# Patient Record
Sex: MALE | Race: WHITE | NOT HISPANIC OR LATINO | URBAN - METROPOLITAN AREA
[De-identification: names, ages, dates, MRNs, and addresses within clinical notes are randomized per-mention and may not be internally consistent; named-entity substitution may affect disease eponyms.]

---
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
## 2024-01-27 ENCOUNTER — PATIENT MESSAGE (OUTPATIENT)
Dept: GENETICS | Facility: CLINIC | Age: 19
End: 2024-01-27
Payer: COMMERCIAL

## 2024-02-19 ENCOUNTER — PATIENT MESSAGE (OUTPATIENT)
Dept: GENETICS | Facility: CLINIC | Age: 19
End: 2024-02-19
Payer: COMMERCIAL

## 2024-04-05 ENCOUNTER — TELEPHONE (OUTPATIENT)
Dept: GENETICS | Facility: CLINIC | Age: 19
End: 2024-04-05
Payer: COMMERCIAL

## 2024-04-05 NOTE — TELEPHONE ENCOUNTER
MOP insisted on a FU up appt with Dr. English. MOP informed that she has passed up other genetic appt due to the fact that she was waiting for a FU up appt for pt. Informed pt mom that pt is on a scheduling list. Provider only sees pt two days out the week. Could not provide a exact date when appt would be. Informed pt mom that a message will be sent to Dr. English and GC to see if appt will be classified as urgent to get a appt ASAP.  Someone will callback with a update. Pt mom verbalized understanding.

## 2024-05-01 ENCOUNTER — TELEPHONE (OUTPATIENT)
Dept: GENETICS | Facility: CLINIC | Age: 19
End: 2024-05-01
Payer: COMMERCIAL

## 2024-05-03 ENCOUNTER — TELEPHONE (OUTPATIENT)
Dept: GENETICS | Facility: CLINIC | Age: 19
End: 2024-05-03
Payer: COMMERCIAL

## 2024-05-03 NOTE — TELEPHONE ENCOUNTER
----- Message from Melissa English MD sent at 5/3/2024  2:24 PM CDT -----  Let's get this patient on with Marta for STACY consent. Orders are in.    Dom, can you please link the orders?    MA

## 2024-05-06 ENCOUNTER — LAB VISIT (OUTPATIENT)
Dept: LAB | Facility: HOSPITAL | Age: 19
End: 2024-05-06
Attending: MEDICAL GENETICS
Payer: COMMERCIAL

## 2024-05-06 ENCOUNTER — OFFICE VISIT (OUTPATIENT)
Dept: GENETICS | Facility: CLINIC | Age: 19
End: 2024-05-06
Payer: COMMERCIAL

## 2024-05-06 DIAGNOSIS — R63.8 SYMPTOMS CONCERNING NUTRITION, METABOLISM, AND DEVELOPMENT: ICD-10-CM

## 2024-05-06 DIAGNOSIS — R63.8 NUTRITION, METABOLISM, AND DEVELOPMENT SYMPTOMS: Primary | ICD-10-CM

## 2024-05-06 DIAGNOSIS — R41.89 BRAIN FOG: ICD-10-CM

## 2024-05-06 LAB — MISCELLANEOUS GENETIC TEST NAME: NORMAL

## 2024-05-06 PROCEDURE — 83605 ASSAY OF LACTIC ACID: CPT | Performed by: MEDICAL GENETICS

## 2024-05-06 PROCEDURE — 36415 COLL VENOUS BLD VENIPUNCTURE: CPT | Performed by: MEDICAL GENETICS

## 2024-05-06 PROCEDURE — 82140 ASSAY OF AMMONIA: CPT | Performed by: MEDICAL GENETICS

## 2024-05-06 PROCEDURE — 82139 AMINO ACIDS QUAN 6 OR MORE: CPT | Performed by: MEDICAL GENETICS

## 2024-05-06 PROCEDURE — 96040 PR GENETIC COUNSELING, EACH 30 MIN: CPT | Mod: S$GLB,,,

## 2024-05-06 PROCEDURE — 99999 PR PBB SHADOW E&M-EST. PATIENT-LVL I: CPT | Mod: PBBFAC,,,

## 2024-05-06 PROCEDURE — 99499 UNLISTED E&M SERVICE: CPT | Mod: S$GLB,,, | Performed by: MEDICAL GENETICS

## 2024-05-06 NOTE — PROGRESS NOTES
ESTABLISHED PATIENT - WHOLE EXOME SEQUENCING CONSENT     Zev Rdz   DOS: 2024   : 2005  MRN: [id number]    I met with Mr. Rdz and his mother for whole exome sequencing (STACY) counseling and consent.     STACY is one of the most comprehensive tests available clinically and is used to look for mutations or likely pathogenic variants the body's exome, which includes over 20,000 genes. We discussed that [facility] will use Mr. Rdz's clinical information when analyzing results and that performing the test as a trio involving the whole family allows [facility] to delineate the significance of any variants identified.      We discussed the limitations and possible results involved in STACY. A diagnosis is found in about 25% of those who have had STACY testing. A positive result may explain Mr. Rdz's symptoms and can be informative for the family. A negative STACY result does not rule out that the underlying condition is heritable in nature and reanalysis or additional genetic testing may be possible in the future. We also discussed that variants of uncertain significance (VUS), or changes in a gene with unknown clinical significance are possible. STACY cannot detect certain genetic changes such as trinucleotide repeats or methylation defects.      We discussed that the family can opt out of receiving incidental or secondary findings from the STACY. These findings are included in the [employer]G's list clincially actionable conditions. About 4% of patients who undergo STACY receive an incidental finding. These genes are involved in various conditions such as hereditary cancer syndromes, heart, neurological, and kidney diseases. We also discussed that unexpected results such as nonpaternity or consanguinity may be revealed.     We also spent time discussing the Genetic Information Nondiscrimination Act (NATE) that protects individuals from discrimination on the basis of genetic information from medical insurance providers and  employers. The law does not cover life insurance or long-term disability insurance, however.     The parents of Zev Rdz did consent for STACY and did elect to receive secondary findings. Parents will submit parental samples and did elect to receive secondary findings for themselves.    RECOMMENDATIONS/PLAN:  [facility] Whole Exome Sequencing Trio; Samples collected today for Mr. Rdz and mother; buccal kit sent home for father; TAT 4-6 weeks  Follow-up pending results of testing    TIME SPENT: 15 minutes with more than 50% spent counseling.     Marta Cotter, [facility], [facility]  Genetic Counselor  [facility]

## 2024-05-07 LAB
AMMONIA PLAS-SCNC: 29 UMOL/L (ref 10–50)
LACTATE SERPL-SCNC: 1.1 MMOL/L (ref 0.5–2.2)

## 2024-05-10 LAB
1ME-HIST SERPL-SCNC: 40 NMOL/ML
3 METHYLGLUTARYLCARNITINE, C6-DC: 0.04 NMOL/ML
3 OH DECENOYLCARNITINE, C10:1 OH: 0.01 NMOL/ML
3 OH DODEDENOYLCARNITINE, C12:1 OH: 0.02 NMOL/ML
3 OH ISOBUTYRYLCARNITINE, C4-OH: 0.01 NMOL/ML
3 OH ISOVALERYLCARITINE, C5 OH: 0.02 NMOL/ML
3 OH OCTADECANOYLCARITINE C 18-OH: 0 NMOL/ML
3ME-HISTIDINE SERPL-SCNC: 6 NMOL/ML (ref 2–9)
3OH-DODECANOYLCARN SERPL-SCNC: 0.01 NMOL/ML
3OH-HEXANOYLCARN SERPL-SCNC: 0.01 NMOL/ML
3OH-LINOLEOYLCARN SERPL-SCNC: <0.02 NMOL/ML
3OH-OLEOYLCARN SERPL-SCNC: <0.01 NMOL/ML
3OH-PALMITOLEYLCARN SERPL-SCNC: 0 NMOL/ML
3OH-PALMITOYLCARN SERPL-SCNC: 0 NMOL/ML
3OH-TDECANOYLCARN SERPL-SCNC: 0 NMOL/ML
3OH-TDECENOYLCARN SERPL-SCNC: 0.02 NMOL/ML
A-AMINOBUTYR SERPL-SCNC: 22 NMOL/ML (ref 9–37)
AAA SERPL-SCNC: 1 NMOL/ML
ACETYLCARN SERPL-SCNC: 4.54 NMOL/ML (ref 2–17.83)
ACRYLYLCARNITINE, C3:1: <0.02 NMOL/ML
ACYLCARNITINE PATTERN SERPL-IMP: NORMAL
ACYLCARNITINE SERPL-SCNC: 5 NMOL/ML (ref 5–30)
ACYLCARNITINE/C0 SERPL-SRTO: 0.1 {RATIO} (ref 0.1–0.8)
ALANINE SERPL-SCNC: 344 NMOL/ML (ref 200–579)
ALLOISOLEUCINE SERPL-SCNC: 3 NMOL/ML
AMINO ACID PAT SERPL-IMP: ABNORMAL
ANNOTATION COMMENT IMP: NORMAL
ANSERINE SERPL-SCNC: 0 NMOL/ML
ARGININE SERPL-SCNC: 113 NMOL/ML (ref 32–120)
ARGININOSUCCINATE SERPL-SCNC: 0 NMOL/ML
ASPARAGINE SERPL-SCNC: 58 NMOL/ML (ref 37–92)
ASPARTATE SERPL-SCNC: 3 NMOL/ML
B-AIB SERPL-SCNC: 2 NMOL/ML
B-ALANINE SERPL-SCNC: 39 NMOL/ML
BENZOYLCARNITINE: <0.01 NMOL/ML
CARNITINE FREE SERPL-SCNC: 39 NMOL/ML (ref 25–54)
CARNITINE SERPL-SCNC: 44 NMOL/ML (ref 34–78)
CARNOSINE SERPL-SCNC: 0 NMOL/ML
CITRULLINE SERPL-SCNC: 53 NMOL/ML (ref 17–46)
CLINICAL BIOCHEMIST REVIEW: NORMAL
CYSTATHIONIN SERPL-SCNC: <1 NMOL/ML
CYSTINE SERPL-SCNC: 31 NMOL/ML (ref 3–95)
DECADIONOYLCARNITINE, C10:2: <0.05 NMOL/ML
DECANOYLCARN SERPL-SCNC: 0.08 NMOL/ML
DECENOYLCARN SERPL-SCNC: 0.08 NMOL/ML
DODECANEDIOYLCARNITINE, C12-DC: 0 NMOL/ML
DODECANOYLCARN SERPL-SCNC: 0.04 NMOL/ML
DODECENOYLCARN SERPL-SCNC: 0.02 NMOL/ML
ETHANOLAMINE SERPL-SCNC: 20 NMOL/ML
FORMIMINOGLUTAMATE, FIGLU: <0.01 NMOL/ML
GABA SERPL-SCNC: 0 NMOL/ML
GLUTAMATE SERPL-SCNC: 39 NMOL/ML (ref 13–113)
GLUTAMINE SERPL-SCNC: 572 NMOL/ML (ref 371–957)
GLUTARYLCARN SERPL-SCNC: 0.03 NMOL/ML
GLYCINE SERPL-SCNC: 260 NMOL/ML (ref 126–490)
HEPTANOYLCARNITINE, C7: <0.01 NMOL/ML
HEXANOYLCARN SERPL-SCNC: 0.02 NMOL/ML
HEXENOLYLCARNITINE, C6:1: <0.01 NMOL/ML
HISTIDINE SERPL-SCNC: 101 NMOL/ML (ref 39–123)
HOMOCITRULLINE SERPL-SCNC: 0 NMOL/ML
ISOBUTYRYLCARN SERPL-SCNC: <0.12 NMOL/ML
ISOLEUCINE SERPL-SCNC: 86 NMOL/ML (ref 36–107)
ISOVALERYL+MEBUTYRYLCARN SERPL-SCNC: 0.1 NMOL/ML
LEUCINE SERPL-SCNC: 150 NMOL/ML (ref 68–183)
LINOLEOYLCARN SERPL-SCNC: 0.03 NMOL/ML
LYSINE SERPL-SCNC: 194 NMOL/ML (ref 103–255)
MALONYLCARNITINE, C3-DC: 0.03 NMOL/ML
METHIONINE SERPL-SCNC: 32 NMOL/ML (ref 4–44)
METHYLMALONYL SUCCINYLCARN, C4-DC: 0.03 NMOL/ML
OCTANEDIOYLCARNITINE, C8-DC: 0 NMOL/ML
OCTANOYLCARN SERPL-SCNC: 0.11 NMOL/ML
OCTENOYLCARN SERPL-SCNC: 0.09 NMOL/ML
OH-LYSINE SERPL-SCNC: 0 NMOL/ML
OH-PROLINE SERPL-SCNC: 23 NMOL/ML (ref 4–29)
OLEOYLCARN SERPL-SCNC: 0.06 NMOL/ML
ORNITHINE SERPL-SCNC: 63 NMOL/ML (ref 38–130)
PALMITOLEYLCARN SERPL-SCNC: 0.01 NMOL/ML
PALMITOYLCARN SERPL-SCNC: 0.07 NMOL/ML
PETN/CREAT UR-RTO: <2 NMOL/ML
PHE SERPL-SCNC: 53 NMOL/ML (ref 35–80)
PHENYLACETYLCARNITINE: 0.02 NMOL/ML
PHOSPHOSERINE/CREAT UR-RTO: 0 NMOL/ML
PROLINE SERPL-SCNC: 143 NMOL/ML (ref 97–368)
PROPIONYLCARN SERPL-SCNC: 0.45 NMOL/ML
SALICYLCARNITINE: <0.05 NMOL/ML
SARCOSINE SERPL-SCNC: 1 NMOL/ML
SERINE SERPL-SCNC: 101 NMOL/ML (ref 63–187)
STEAROYLCARN SERPL-SCNC: 0.03 NMOL/ML
TAURINE UR-SCNC: 54 NMOL/ML (ref 42–156)
TDECADIENOYLCARN SERPL-SCNC: 0.02 NMOL/ML
TDECANOYLCARN SERPL-SCNC: 0.01 NMOL/ML
TDECENOYLCARN SERPL-SCNC: 0.03 NMOL/ML
THREONINE SERPL-SCNC: 119 NMOL/ML (ref 85–231)
TIGLYLCARNITINE, C5:1: 0.01 NMOL/ML
TRYPTOPHAN SERPL-SCNC: 54 NMOL/ML (ref 29–77)
TYROSINE SERPL-SCNC: 48 NMOL/ML (ref 31–90)
VALINE SERPL-SCNC: 179 NMOL/ML (ref 136–309)

## 2024-07-08 ENCOUNTER — TELEPHONE (OUTPATIENT)
Dept: GENETICS | Facility: CLINIC | Age: 19
End: 2024-07-08
Payer: COMMERCIAL

## 2024-07-08 ENCOUNTER — PATIENT MESSAGE (OUTPATIENT)
Dept: GENETICS | Facility: CLINIC | Age: 19
End: 2024-07-08
Payer: COMMERCIAL

## 2024-07-08 NOTE — TELEPHONE ENCOUNTER
Spoke with Mr. Rdz's mother to disclose results of testing of his STACY which was negative/normal. MOP expressed frustration regarding results. She asked if I was aware of options for enzyme testing and I explained that enzyme testing is outside of my scope of practice. Zev is recommended to follow-up with genetics in 2-3 years for reanalysis of testing. Encouraged mother to have Zev reach out if there are any questions/concerns regarding results.

## 2024-12-16 ENCOUNTER — OFFICE VISIT (OUTPATIENT)
Dept: PEDIATRIC GASTROENTEROLOGY | Facility: CLINIC | Age: 19
End: 2024-12-16
Payer: COMMERCIAL

## 2024-12-16 VITALS
HEIGHT: 73 IN | OXYGEN SATURATION: 100 % | TEMPERATURE: 98 F | DIASTOLIC BLOOD PRESSURE: 88 MMHG | HEART RATE: 68 BPM | BODY MASS INDEX: 21.2 KG/M2 | WEIGHT: 159.94 LBS | SYSTOLIC BLOOD PRESSURE: 131 MMHG

## 2024-12-16 DIAGNOSIS — E88.9: Primary | ICD-10-CM

## 2024-12-16 PROCEDURE — 99215 OFFICE O/P EST HI 40 MIN: CPT | Mod: S$GLB,,, | Performed by: PEDIATRICS

## 2024-12-16 PROCEDURE — 99999 PR PBB SHADOW E&M-EST. PATIENT-LVL III: CPT | Mod: PBBFAC,,, | Performed by: PEDIATRICS

## 2024-12-16 PROCEDURE — 99417 PROLNG OP E/M EACH 15 MIN: CPT | Mod: S$GLB,,, | Performed by: PEDIATRICS

## 2024-12-16 NOTE — LETTER
December 16, 2024        Malka Santacruz MD  [address] [facility] [address]             [facility]  [address]  [facility] [address]  Phone: [phone]   Patient: Zev Rdz   MR Number: [id number]   YOB: 2005   Date of Visit: 12/16/2024       Dear Dr. Santacruz:    Thank you for referring Zev Rdz to me for evaluation. Attached you will find relevant portions of my assessment and plan of care.    If you have questions, please do not hesitate to call me. I look forward to following Zev Rdz along with you.    Sincerely,      Gregory Auguste MD            CC  No Recipients    Enclosure

## 2024-12-16 NOTE — PROGRESS NOTES
Subjective:      Patient ID: Zev Rdz is a 19 y.o. male.    Chief Complaint: Follow-up    Complications of Liver Disease  1. Ascites: no  2. SBP:  no  3. Varices: unknown   4. Acute variceal hemorrhage:  no  5. Encephalopathy:  no  6. Hepatorenal syndrome:  no  7. Hepatopulmonary syndrome:  no  8. Growth failure:  no  9. Cholangitis:  no  10. Pathological fracture:  no  11. Pruritus:  no    Liver-related Investigations and Screening  1. Liver biopsy: nd  2. EGD: nd  3. Colonoscopy: nd  4. ERCP nd  5. Paracentesis:nd  6. PTC: nd  7. US:  one done but outside [facility]  8. MR:  nd  9. AFP: nd    Liver-related Medications  #  Multiple supplements    Calculated PELD/MELD:  Computed MELD 3.0 unavailable. One or more values for this score either were not found within the given timeframe or did not fit some other criterion.  Computed MELD-Na unavailable. One or more values for this score either were not found within the given timeframe or did not fit some other criterion.     [facility]  [facility]      19 y.o. gentleman seen in follow-up due to the family's concern that he may have a liver-based  metabolic condition, particularly one associate with elevated ammonia.  The patient's last visit with me was on 11/15/2023.     Since our last visit he underwent assessment by Genetics.  Both STACY and mitochondrial genome sequencing were done and were non-diagnostic.    Based on their observations that protein seems to exacerbate certain behavioral changes, they put him on a low-protein diet with advice from an RD.  They described improvement after the 1st week even of being on the diet and it got better by week 2 and has sustained improvements until today.  Keeping up with the diet however is proving onerous in the long run.    They describe to big episodes behavior change since being on the low protein diet but both of them or during periods where they could figure out what dietary  indiscretion may have been associated with the problem.    He has started college at [employer] for music.  He still plays the organ.    They have an appointment at the [facility] upcoming.  The explored getting involvement from endocrinology however had difficulty identifying a physician that maybe able to help.        Original HPI  His mom describes abrupt change in Zev, temporally associated with Accutane exposure (May-Oct 2022).  She describes a vibrant, bright, motivated, musical prodigy who developed acne, dandruff, a hot head and brain burning, catabolic state, lost executive function, fatigue.   He developed milk allergy, reflux, vomiting, unexplained fevers, increased susceptibility to illness and allergies, delayed speech, reactions to medications (e.g.  Steroids, Zantac, Benadryl, Singulair), reactions to supplements (niacinamide, melatonin, 5 HTP, difficulty maintaining normal serum vitamin-D level), sensitivity to caffeine and alcohol, turning a gray blue color well inactive (better with sugar), swelling in his bottom lip, sleep walking, lymph nodes in his neck swollen for long periods of time, lethargy, difficulty focusing, tightness in his head, depression, 20 lb weight loss, blood sugar imbalance, skin tags, high blood pressure, lipomas, adrenal crisis after discontinuing Accutane, turning red and responds to foods/stress, sweating while eating, heart beating harder after some meals, waking to urinate during the night often large quantities (stopped when he started taking L arginine and Neisha and), insomnia, difficulty driving, tightness in right arm, clicking in his hip, knees and ankles, decline in cognitive function and ability, decreased processing and stamina, sensitivity to extreme temperatures, belligerent drunk reaction to protein drink and milk, dependence on amino acids and high doses of B vitamins.    He has had a lot of testing done to help try to identify a cause for these problems.   In December 2021 he had a liver panel which was normal.  In April 2022 he had a normal liver panel, in May 2023 he had a normal CBC, in July 2023 he had a normal liver panel, ammonia level, CBC.  He is also had a normal lipid panel, several normal thyroid tests, and normal MR of the brain including MR spectroscopy.  They have also submitted to a number of complementary and alternative medicine sort of tests which have purported to show some perturbations in different amino acids or other biochemical pathways.  Some of these appeared to have included single gene tests or at least testing for certain genetic polymorphisms.  They worked with a commercial outfit to consider more conventional genetic testing, however they were turned off when the company would not run the test through their insurance until after they would provided them with a biological sample and therefore have not pursued this.    1 stillborn sib  24 yr old sis with hemochromatosis          Review of Systems--see HPI    Objective:   Physical Exam  Vitals reviewed.   Constitutional:       General: He is not in acute distress.     Appearance: He is normal weight.   Eyes:      General: No scleral icterus.  Cardiovascular:      Rate and Rhythm: Normal rate.   Pulmonary:      Effort: Pulmonary effort is normal. No respiratory distress.   Skin:     Coloration: Skin is not jaundiced.   Neurological:      Mental Status: He is alert and oriented to person, place, and time.   Psychiatric:         Mood and Affect: Mood normal.         Behavior: Behavior normal.         Thought Content: Thought content normal.       Labs/Imaging:          Assessment:     1. Metabolic problem      Plan:   19 y.o. young man with concern for a liver-based metabolic condition.  Normal STACY and mitochondrial genome are reassuring and effectively rule-out a long list of liver and metabolic diseases.    One thing which hasn't been explored yet (I don't think) is ruling out a  portosystemic shunt.  While his symptoms seem far out of proportion to the patient's who I have cared for with this condition, it is easy enough to test him for this with Doppler ultrasonography of the abdomen.    In terms of other avenues to explore, I thought perhaps neurology could help to the extent that his episodes are marked by abrupt behavior change.  Unfortunately I am ill acquainted with the adult-based neurologists so I can't suggest someone in particular.    [facility] is the other group who may have something to offer, although with normal STACY and mitochondrial sequencing they may feel like there isn't much left to test.      I spent 70 minutes on the day of this encounter in preparation, evaluation, treatment, care planning and documentation for this patient.

## 2024-12-23 ENCOUNTER — HOSPITAL ENCOUNTER (OUTPATIENT)
Dept: RADIOLOGY | Facility: HOSPITAL | Age: 19
Discharge: HOME | End: 2024-12-23
Attending: PEDIATRICS
Payer: COMMERCIAL

## 2024-12-23 DIAGNOSIS — E88.9: ICD-10-CM

## 2024-12-23 PROCEDURE — 76705 ECHO EXAM OF ABDOMEN: CPT | Mod: TC

## 2024-12-27 ENCOUNTER — PATIENT MESSAGE (OUTPATIENT)
Dept: PEDIATRIC GASTROENTEROLOGY | Facility: CLINIC | Age: 19
End: 2024-12-27
Payer: COMMERCIAL